# Patient Record
Sex: FEMALE | Race: WHITE | NOT HISPANIC OR LATINO | URBAN - METROPOLITAN AREA
[De-identification: names, ages, dates, MRNs, and addresses within clinical notes are randomized per-mention and may not be internally consistent; named-entity substitution may affect disease eponyms.]

---

## 2017-11-07 ENCOUNTER — APPOINTMENT (OUTPATIENT)
Dept: PHYSICAL THERAPY | Facility: CLINIC | Age: 52
End: 2017-11-07
Payer: COMMERCIAL

## 2017-11-07 PROCEDURE — 97161 PT EVAL LOW COMPLEX 20 MIN: CPT

## 2017-11-07 PROCEDURE — 97014 ELECTRIC STIMULATION THERAPY: CPT

## 2017-11-07 PROCEDURE — G8979 MOBILITY GOAL STATUS: HCPCS

## 2017-11-07 PROCEDURE — G0283 ELEC STIM OTHER THAN WOUND: HCPCS

## 2017-11-07 PROCEDURE — 97140 MANUAL THERAPY 1/> REGIONS: CPT

## 2017-11-07 PROCEDURE — G8978 MOBILITY CURRENT STATUS: HCPCS

## 2017-11-09 ENCOUNTER — APPOINTMENT (OUTPATIENT)
Dept: PHYSICAL THERAPY | Facility: CLINIC | Age: 52
End: 2017-11-09
Payer: COMMERCIAL

## 2017-11-09 PROCEDURE — 97014 ELECTRIC STIMULATION THERAPY: CPT

## 2017-11-09 PROCEDURE — 97140 MANUAL THERAPY 1/> REGIONS: CPT

## 2017-11-09 PROCEDURE — G0283 ELEC STIM OTHER THAN WOUND: HCPCS

## 2017-11-09 PROCEDURE — 97110 THERAPEUTIC EXERCISES: CPT

## 2017-11-14 ENCOUNTER — APPOINTMENT (OUTPATIENT)
Dept: PHYSICAL THERAPY | Facility: CLINIC | Age: 52
End: 2017-11-14
Payer: COMMERCIAL

## 2017-11-14 PROCEDURE — 97140 MANUAL THERAPY 1/> REGIONS: CPT

## 2017-11-14 PROCEDURE — G0283 ELEC STIM OTHER THAN WOUND: HCPCS

## 2017-11-14 PROCEDURE — 97014 ELECTRIC STIMULATION THERAPY: CPT

## 2017-11-14 PROCEDURE — 97110 THERAPEUTIC EXERCISES: CPT

## 2017-11-15 ENCOUNTER — APPOINTMENT (OUTPATIENT)
Dept: PHYSICAL THERAPY | Facility: CLINIC | Age: 52
End: 2017-11-15
Payer: COMMERCIAL

## 2017-11-21 ENCOUNTER — APPOINTMENT (OUTPATIENT)
Dept: PHYSICAL THERAPY | Facility: CLINIC | Age: 52
End: 2017-11-21
Payer: COMMERCIAL

## 2017-11-21 PROCEDURE — 97014 ELECTRIC STIMULATION THERAPY: CPT

## 2017-11-21 PROCEDURE — G0283 ELEC STIM OTHER THAN WOUND: HCPCS

## 2017-11-21 PROCEDURE — 97110 THERAPEUTIC EXERCISES: CPT

## 2017-11-21 PROCEDURE — 97140 MANUAL THERAPY 1/> REGIONS: CPT

## 2017-11-30 ENCOUNTER — APPOINTMENT (OUTPATIENT)
Dept: PHYSICAL THERAPY | Facility: CLINIC | Age: 52
End: 2017-11-30
Payer: COMMERCIAL

## 2017-12-05 ENCOUNTER — APPOINTMENT (OUTPATIENT)
Dept: PHYSICAL THERAPY | Facility: CLINIC | Age: 52
End: 2017-12-05
Payer: COMMERCIAL

## 2017-12-05 PROCEDURE — 97014 ELECTRIC STIMULATION THERAPY: CPT

## 2017-12-05 PROCEDURE — 97110 THERAPEUTIC EXERCISES: CPT

## 2017-12-05 PROCEDURE — 97140 MANUAL THERAPY 1/> REGIONS: CPT

## 2017-12-05 PROCEDURE — G0283 ELEC STIM OTHER THAN WOUND: HCPCS

## 2017-12-07 ENCOUNTER — APPOINTMENT (OUTPATIENT)
Dept: PHYSICAL THERAPY | Facility: CLINIC | Age: 52
End: 2017-12-07
Payer: COMMERCIAL

## 2017-12-07 PROCEDURE — 97140 MANUAL THERAPY 1/> REGIONS: CPT

## 2017-12-07 PROCEDURE — 97110 THERAPEUTIC EXERCISES: CPT

## 2017-12-07 PROCEDURE — G0283 ELEC STIM OTHER THAN WOUND: HCPCS

## 2017-12-07 PROCEDURE — 97014 ELECTRIC STIMULATION THERAPY: CPT

## 2017-12-12 ENCOUNTER — APPOINTMENT (OUTPATIENT)
Dept: PHYSICAL THERAPY | Facility: CLINIC | Age: 52
End: 2017-12-12
Payer: COMMERCIAL

## 2017-12-12 PROCEDURE — 97140 MANUAL THERAPY 1/> REGIONS: CPT

## 2017-12-12 PROCEDURE — 97110 THERAPEUTIC EXERCISES: CPT

## 2017-12-19 ENCOUNTER — APPOINTMENT (OUTPATIENT)
Dept: PHYSICAL THERAPY | Facility: CLINIC | Age: 52
End: 2017-12-19
Payer: COMMERCIAL

## 2017-12-19 PROCEDURE — 97110 THERAPEUTIC EXERCISES: CPT

## 2017-12-19 PROCEDURE — G8979 MOBILITY GOAL STATUS: HCPCS

## 2017-12-19 PROCEDURE — 97530 THERAPEUTIC ACTIVITIES: CPT

## 2017-12-19 PROCEDURE — 97014 ELECTRIC STIMULATION THERAPY: CPT

## 2017-12-19 PROCEDURE — G8978 MOBILITY CURRENT STATUS: HCPCS

## 2017-12-19 PROCEDURE — 97140 MANUAL THERAPY 1/> REGIONS: CPT

## 2017-12-19 PROCEDURE — G0283 ELEC STIM OTHER THAN WOUND: HCPCS

## 2017-12-21 ENCOUNTER — APPOINTMENT (OUTPATIENT)
Dept: PHYSICAL THERAPY | Facility: CLINIC | Age: 52
End: 2017-12-21
Payer: COMMERCIAL

## 2017-12-21 PROCEDURE — 97112 NEUROMUSCULAR REEDUCATION: CPT

## 2017-12-21 PROCEDURE — 97110 THERAPEUTIC EXERCISES: CPT

## 2017-12-21 PROCEDURE — 97140 MANUAL THERAPY 1/> REGIONS: CPT

## 2017-12-27 ENCOUNTER — APPOINTMENT (OUTPATIENT)
Dept: PHYSICAL THERAPY | Facility: CLINIC | Age: 52
End: 2017-12-27
Payer: COMMERCIAL

## 2017-12-27 PROCEDURE — 97110 THERAPEUTIC EXERCISES: CPT

## 2017-12-27 PROCEDURE — 97140 MANUAL THERAPY 1/> REGIONS: CPT

## 2017-12-29 ENCOUNTER — APPOINTMENT (OUTPATIENT)
Dept: PHYSICAL THERAPY | Facility: CLINIC | Age: 52
End: 2017-12-29
Payer: COMMERCIAL

## 2018-01-03 ENCOUNTER — APPOINTMENT (OUTPATIENT)
Dept: PHYSICAL THERAPY | Facility: CLINIC | Age: 53
End: 2018-01-03
Payer: COMMERCIAL

## 2018-01-03 PROCEDURE — 97140 MANUAL THERAPY 1/> REGIONS: CPT

## 2018-01-03 PROCEDURE — 97110 THERAPEUTIC EXERCISES: CPT

## 2018-01-05 ENCOUNTER — APPOINTMENT (OUTPATIENT)
Dept: PHYSICAL THERAPY | Facility: CLINIC | Age: 53
End: 2018-01-05
Payer: COMMERCIAL

## 2018-01-05 PROCEDURE — 97140 MANUAL THERAPY 1/> REGIONS: CPT

## 2018-01-05 PROCEDURE — 97110 THERAPEUTIC EXERCISES: CPT

## 2018-01-10 ENCOUNTER — APPOINTMENT (OUTPATIENT)
Dept: PHYSICAL THERAPY | Facility: CLINIC | Age: 53
End: 2018-01-10
Payer: COMMERCIAL

## 2018-01-10 PROCEDURE — 97140 MANUAL THERAPY 1/> REGIONS: CPT

## 2018-01-10 PROCEDURE — 97110 THERAPEUTIC EXERCISES: CPT

## 2018-01-15 ENCOUNTER — APPOINTMENT (OUTPATIENT)
Dept: PHYSICAL THERAPY | Facility: CLINIC | Age: 53
End: 2018-01-15
Payer: COMMERCIAL

## 2018-01-15 PROCEDURE — 97140 MANUAL THERAPY 1/> REGIONS: CPT

## 2018-01-15 PROCEDURE — 97110 THERAPEUTIC EXERCISES: CPT

## 2018-01-15 PROCEDURE — 97530 THERAPEUTIC ACTIVITIES: CPT

## 2018-01-19 ENCOUNTER — APPOINTMENT (OUTPATIENT)
Dept: PHYSICAL THERAPY | Facility: CLINIC | Age: 53
End: 2018-01-19
Payer: COMMERCIAL

## 2018-01-19 PROCEDURE — 97110 THERAPEUTIC EXERCISES: CPT

## 2018-01-19 PROCEDURE — 97140 MANUAL THERAPY 1/> REGIONS: CPT

## 2018-01-19 PROCEDURE — L3010 FOOT LONGITUDINAL ARCH SUPPO: HCPCS

## 2018-01-24 ENCOUNTER — OFFICE VISIT (OUTPATIENT)
Dept: PHYSICAL THERAPY | Facility: CLINIC | Age: 53
End: 2018-01-24
Payer: COMMERCIAL

## 2018-01-24 DIAGNOSIS — M79.671 RIGHT FOOT PAIN: ICD-10-CM

## 2018-01-24 DIAGNOSIS — M72.2 PLANTAR FASCIAL FIBROMATOSIS: Primary | ICD-10-CM

## 2018-01-24 NOTE — PROGRESS NOTES
Daily Note     Today's date: 2018  Patient name: Dominique Camarillo  : 1965  MRN: [id number]  Referring provider: Chato Moreno  Dx: No diagnosis found  Subjective:Pt reports pain level R post tib area is 4/10  Objective: See treatment diary below  Precautions: Spinal stenosis    Daily Treatment Diary     Manual              Laser 5'            GT x            K-tape x                                          Exercise Diary              Gastroc stretch 30" x 3            Soleus stretch 30" x 3            SLS - blue foam 30" x 3            Bike 6'            BAPS board M/L x 30            HR B/L with tennis ball 2 x 10                                                                                                                                                                                                      Modalities              CP/TENS 10'                                              Assessment: Tolerated treatment well  Patient could benefit from continued PT      Plan: Continue per plan of care

## 2018-01-26 ENCOUNTER — APPOINTMENT (OUTPATIENT)
Dept: PHYSICAL THERAPY | Facility: CLINIC | Age: 53
End: 2018-01-26
Payer: COMMERCIAL

## 2018-01-29 ENCOUNTER — EVALUATION (OUTPATIENT)
Dept: PHYSICAL THERAPY | Facility: CLINIC | Age: 53
End: 2018-01-29
Payer: COMMERCIAL

## 2018-01-29 ENCOUNTER — APPOINTMENT (OUTPATIENT)
Dept: PHYSICAL THERAPY | Facility: CLINIC | Age: 53
End: 2018-01-29
Payer: COMMERCIAL

## 2018-01-29 DIAGNOSIS — M72.2 PLANTAR FASCIAL FIBROMATOSIS: Primary | ICD-10-CM

## 2018-01-29 PROCEDURE — 97110 THERAPEUTIC EXERCISES: CPT

## 2018-01-29 PROCEDURE — 97140 MANUAL THERAPY 1/> REGIONS: CPT

## 2018-01-29 NOTE — PROGRESS NOTES
Daily Note     Today's date: 2018  Patient name: Yann Lloyd  : 1965  MRN: [id number]  Referring provider: Dafne Peoples  Dx:   Encounter Diagnosis   Name Primary?  Plantar fascial fibromatosis Yes                  Subjective: Notes sharp pain in heel over the weekend that was worse than usual       Objective: See treatment diary below    Precautions: Spinal stenosis    Daily Treatment Diary     Manual             Laser 5' x           GT x            K-tape x            Low dye taping  x                            Exercise Diary              Gastroc stretch 30" x 3 3x30"           Soleus stretch 30" x 3 3x30"           SLS - blue foam 30" x 3 3x30"           Bike 6' 6'           BAPS board M/L x 30 M/L x30           HR B/L with tennis ball 2 x 10 2x10                                                                                                                                                                                                     Modalities              CP/TENS 10'                                              Assessment: Tolerated treatment well  Patient could benefit from continued PT  May also potentially benefit from application of iontophoresis with dexamethasone  Plan: Continue per plan of care

## 2018-02-01 ENCOUNTER — APPOINTMENT (OUTPATIENT)
Dept: PHYSICAL THERAPY | Facility: CLINIC | Age: 53
End: 2018-02-01
Payer: COMMERCIAL

## 2018-02-08 ENCOUNTER — EVALUATION (OUTPATIENT)
Dept: PHYSICAL THERAPY | Facility: CLINIC | Age: 53
End: 2018-02-08
Payer: COMMERCIAL

## 2018-02-08 DIAGNOSIS — M72.2 PLANTAR FASCIAL FIBROMATOSIS: Primary | ICD-10-CM

## 2018-02-08 PROCEDURE — 97110 THERAPEUTIC EXERCISES: CPT

## 2018-02-08 PROCEDURE — 97140 MANUAL THERAPY 1/> REGIONS: CPT

## 2018-02-08 NOTE — PROGRESS NOTES
PT Re-Evaluation     Today's date: 2018  Patient name: Hakan Coello  : 1965  MRN: [id number]  Referring provider: Yulia Casey  Dx:   Encounter Diagnosis   Name Primary?  Plantar fascial fibromatosis Yes                  Assessment  Impairments: abnormal gait, activity intolerance and pain with function    Assessment details: Patient has been making steady progress since the start of care, however has continued functional limitations  Pt will benefit from continued PT at this time in order to progress patient toward established goals  Please consider use of iontophoresis with dexamethasone for localized inflammation and continued pain  Pt was dispensed custom fitted orthotics at today's visit; will assess response to this over the next few weeks as pt gradually increases wear time  Thank you for this referral   Understanding of Dx/Px/POC: good   Prognosis: good    Goals  ST  Pt will improve soft tissue restrictions in plantar fascia within 3 weeks  2  Pt will decrease pain at worst to <3/10 within 3 weeks  LT  Pt will be independent with HEP by discharge  2  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  Plan  Patient would benefit from: skilled PT  Planned modality interventions: TENS, cryotherapy and iontophoresis  Planned therapy interventions: neuromuscular re-education, orthotic management and training, strengthening, stretching, manual therapy, therapeutic exercise, therapeutic activities, graded exercise and home exercise program  Other planned therapy interventions: luis fernando  Frequency: 2x week  Duration in weeks: 4  Treatment plan discussed with: patient        Subjective Evaluation    History of Present Illness  Mechanism of injury: Pt notes her pain has gotten better overall  Reports reduced pain after 2 week flare up  Continues to have pain at medial heel with prolonged standing/walking    Pain  Current pain ratin  At best pain ratin  At worst pain ratin  Location: R medial heel  Quality: sharp and dull ache  Relieving factors: medications  Aggravating factors: standing and walking  Symptom course: Initially was improving, however with recent flare up pt feels it got worse in the past two weeks  Social Support    Employment status: working (professor)    Diagnostic Tests  No diagnostic tests performed        Objective     Observations     Additional Observation Details  Decreased arch height    Tenderness     Right Ankle/Foot   Tenderness in the medial calcaneus and plantar fascia       Active Range of Motion     Right Ankle/Foot   Normal active range of motion    Strength/Myotome Testing     Right Ankle/Foot   Normal strength      Precautions: Spinal stenosis     Daily Treatment Diary    "x" indicates performed  Manual                   Laser 5' x  x                 GT x    x                 K-tape x                     Low dye taping   x                                                 Exercise Diary                        Gastroc stretch 30" x 3 3x30"  x                 Soleus stretch 30" x 3 3x30"  x                 SLS - blue foam 30" x 3 3x30"  x                 Bike 6' 6'  x                 BAPS board M/L x 30 M/L x30  x                 HR B/L with tennis ball 2 x 10 2x10  x                                                                                                                                                                                                                                                                                                                                                                       Modalities                        CP/TENS 10'    x

## 2018-02-08 NOTE — LETTER
2018    Jorden Brody at [facility]    Patient: Merrill Rojas   YOB: 1965   Date of Visit: 2018       Dear Dr Tre Trimble:    Please review the attached Plan of Care from [facility] recent visit  Please verify that you agree therapy should continue by signing the attached document and sending it back to our office  If you have any questions or concerns, please don't hesitate to call  Sincerely,    Arlen Brooks, PT      Referring Provider:      I certify that I have read the below Plan of Care and certify the need for these services furnished under this plan of treatment while under my care  Jorden Brody at [address] [facility] [place]          PT Re-Evaluation     Today's date: 2018  Patient name: Merrill Rojas  : 1965  MRN: [id number]  Referring provider: Denia Walker  Dx:   Encounter Diagnosis   Name Primary?  Plantar fascial fibromatosis Yes                  Assessment  Impairments: abnormal gait, activity intolerance and pain with function    Assessment details: Patient has been making steady progress since the start of care, however has continued functional limitations  Pt will benefit from continued PT at this time in order to progress patient toward established goals  Please consider use of iontophoresis with dexamethasone for localized inflammation and continued pain  Pt was dispensed custom fitted orthotics at today's visit; will assess response to this over the next few weeks as pt gradually increases wear time  Thank you for this referral   Understanding of Dx/Px/POC: good   Prognosis: good    Goals  ST  Pt will improve soft tissue restrictions in plantar fascia within 3 weeks  2  Pt will decrease pain at worst to <3/10 within 3 weeks  LT  Pt will be independent with HEP by discharge    2  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  Plan  Patient would benefit from: skilled PT  Planned modality interventions: TENS, cryotherapy and iontophoresis  Planned therapy interventions: neuromuscular re-education, orthotic management and training, strengthening, stretching, manual therapy, therapeutic exercise, therapeutic activities, graded exercise and home exercise program  Other planned therapy interventions: graston  Frequency: 2x week  Duration in weeks: 4  Treatment plan discussed with: patient        Subjective Evaluation    History of Present Illness  Mechanism of injury: Pt notes her pain has gotten better overall  Reports reduced pain after 2 week flare up  Continues to have pain at medial heel with prolonged standing/walking  Pain  Current pain ratin  At best pain ratin  At worst pain ratin  Location: R medial heel  Quality: sharp and dull ache  Relieving factors: medications  Aggravating factors: standing and walking  Symptom course: Initially was improving, however with recent flare up pt feels it got worse in the past two weeks  Social Support    Employment status: working (professor)    Diagnostic Tests  No diagnostic tests performed        Objective     Observations     Additional Observation Details  Decreased arch height    Tenderness     Right Ankle/Foot   Tenderness in the medial calcaneus and plantar fascia       Active Range of Motion     Right Ankle/Foot   Normal active range of motion    Strength/Myotome Testing     Right Ankle/Foot   Normal strength      Precautions: Spinal stenosis     Daily Treatment Diary    "x" indicates performed  Manual                   Laser 5' x  x                 GT x    x                 K-tape x                     Low dye taping   x                                                 Exercise Diary                        Gastroc stretch 30" x 3 3x30"  x                 Soleus stretch 30" x 3 3x30"  x                 SLS - blue foam 30" x 3 3x30"  x               Bike 6' 6'  x                 BAPS board M/L x 30 M/L x30  x                 HR B/L with tennis ball 2 x 10 2x10  x                                                                                                                                                                                                                                                                                                                                                                       Modalities                        CP/TENS 10'    x

## 2018-02-14 ENCOUNTER — APPOINTMENT (OUTPATIENT)
Dept: PHYSICAL THERAPY | Facility: CLINIC | Age: 53
End: 2018-02-14
Payer: COMMERCIAL

## 2018-02-16 ENCOUNTER — OFFICE VISIT (OUTPATIENT)
Dept: PHYSICAL THERAPY | Facility: CLINIC | Age: 53
End: 2018-02-16
Payer: COMMERCIAL

## 2018-02-16 DIAGNOSIS — M72.2 PLANTAR FASCIAL FIBROMATOSIS: Primary | ICD-10-CM

## 2018-02-16 PROCEDURE — 97140 MANUAL THERAPY 1/> REGIONS: CPT

## 2018-02-16 NOTE — PROGRESS NOTES
PT Re-Evaluation     Today's date: 2018  Patient name: Lina Moulton  : 1965  MRN: [id number]  Referring provider: Gideon Beyer  Dx:   Encounter Diagnosis   Name Primary?  Plantar fascial fibromatosis Yes                  Assessment  Impairments: abnormal gait, activity intolerance and pain with function    Assessment details: Patient has been making steady progress since the start of care, however has continued functional limitations  Pt will benefit from continued PT at this time in order to progress patient toward established goals  Please consider use of iontophoresis with dexamethasone for localized inflammation and continued pain  Pt was dispensed custom fitted orthotics at today's visit; will assess response to this over the next few weeks as pt gradually increases wear time  Thank you for this referral   Understanding of Dx/Px/POC: good   Prognosis: good    Goals  ST  Pt will improve soft tissue restrictions in plantar fascia within 3 weeks  2  Pt will decrease pain at worst to <3/10 within 3 weeks  LT  Pt will be independent with HEP by discharge  2  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  Plan  Patient would benefit from: skilled PT  Planned modality interventions: TENS, cryotherapy and iontophoresis  Planned therapy interventions: neuromuscular re-education, orthotic management and training, strengthening, stretching, manual therapy, therapeutic exercise, therapeutic activities, graded exercise and home exercise program  Other planned therapy interventions: luis fernando  Frequency: 2x week  Duration in weeks: 4  Treatment plan discussed with: patient        Subjective Evaluation    History of Present Illness  Mechanism of injury: Pt notes her pain has gotten better overall  Reports reduced pain after 2 week flare up  Continues to have pain at medial heel with prolonged standing/walking    Pain  Current pain ratin  At best pain ratin  At worst pain ratin  Location: R medial heel  Quality: sharp and dull ache  Relieving factors: medications  Aggravating factors: standing and walking  Symptom course: Initially was improving, however with recent flare up pt feels it got worse in the past two weeks  Social Support    Employment status: working (professor)    Diagnostic Tests  No diagnostic tests performed        Objective     Observations     Additional Observation Details  Decreased arch height    Tenderness     Right Ankle/Foot   Tenderness in the medial calcaneus and plantar fascia       Active Range of Motion     Right Ankle/Foot   Normal active range of motion    Strength/Myotome Testing     Right Ankle/Foot   Normal strength      Precautions: Spinal stenosis     Daily Treatment Diary    "x" indicates performed  Manual                   Laser 5' x  x                 GT x    x                 K-tape x                     Low dye taping   x                                                 Exercise Diary                        Gastroc stretch 30" x 3 3x30"  x                 Soleus stretch 30" x 3 3x30"  x                 SLS - blue foam 30" x 3 3x30"  x                 Bike 6' 6'  x                 BAPS board M/L x 30 M/L x30  x                 HR B/L with tennis ball 2 x 10 2x10  x                                                                                                                                                                                                                                                                                                                                                                       Modalities                        CP/TENS 10'    x

## 2018-02-16 NOTE — PROGRESS NOTES
Daily Note     Today's date: 2018  Patient name: Khushbu Rodrigues  : 1965  MRN: [id number]  Referring provider: Coral Koch  Dx:   Encounter Diagnosis   Name Primary?  Plantar fascial fibromatosis Yes                  Subjective: Pt reports she had her foot x-rayed yesterday  Her Dr  is sending her for a consult with [facility] podiatrist, which is scheduled for 18  Objective: See treatment diary below    Daily Treatment Diary    X = performed  Manual                   Laser 5' x  x                 GT x    x                 K-tape x                     Low dye taping   x                                                 Exercise Diary                        Gastroc stretch 30" x 3 3x30"  x                 Soleus stretch 30" x 3 3x30"  x                 SLS - blue foam 30" x 3 3x30"  x                 Bike 6' 6'  x                 BAPS board M/L x 30 M/L x30  x                 HR B/L with tennis ball 2 x 10 2x10  x                                                                                                                                                                                                                                                                                                                                                                       Modalities                        CP/TENS 10'    10'                                                                         Assessment: Restrictions noted R heel during Graston  Demonstrates good knowledge of ex program      Plan: Continue per plan of care

## 2018-02-19 ENCOUNTER — APPOINTMENT (OUTPATIENT)
Dept: PHYSICAL THERAPY | Facility: CLINIC | Age: 53
End: 2018-02-19
Payer: COMMERCIAL

## 2018-02-22 ENCOUNTER — APPOINTMENT (OUTPATIENT)
Dept: PHYSICAL THERAPY | Facility: CLINIC | Age: 53
End: 2018-02-22
Payer: COMMERCIAL

## 2018-03-01 ENCOUNTER — OFFICE VISIT (OUTPATIENT)
Dept: PHYSICAL THERAPY | Facility: CLINIC | Age: 53
End: 2018-03-01
Payer: COMMERCIAL

## 2018-03-01 DIAGNOSIS — M72.2 PLANTAR FASCIAL FIBROMATOSIS: Primary | ICD-10-CM

## 2018-03-01 PROCEDURE — 97140 MANUAL THERAPY 1/> REGIONS: CPT

## 2018-03-01 PROCEDURE — 97110 THERAPEUTIC EXERCISES: CPT

## 2018-03-01 NOTE — PROGRESS NOTES
Daily Note     Today's date: 3/1/2018  Patient name: Marzena Mcdaniels  : 1965  MRN: [id number]  Referring provider: Rona Rodriguez  Dx:   Encounter Diagnosis   Name Primary?  Plantar fascial fibromatosis Yes                  Subjective: Pt reports she had her consult with Dr Luciano Payan, given prescription for iontophoresis  Pt was told she has OA in her R 1st MT head and a spur in her R heel, but feels her pain is from plantar fasciitis  Notes pain level in her R heel is 4-5/10 today  Objective: See treatment diary below    Daily Treatment Diary    X = performed  Manual  1/24 1/29  2/16  3/1               Laser 5' x  x  x               GT x    x  x               K-tape x                     Low dye taping   x                                                 Exercise Diary       2/16  3/1               Gastroc stretch 30" x 3 3x30"  x  x               Soleus stretch 30" x 3 3x30"  x  x               SLS - blue foam 30" x 3 3x30"  x  x               Bike 6' 6'  x  x               BAPS board M/L x 30 M/L x30  x  x               HR B/L with tennis ball 2 x 10 2x10  x  x                                                                                                                                                                                                                                                                                                                                                                     Modalities       2/16  3/1               CP/TENS 10'    10'  x                                                                       Assessment: Restrictions noted R heel during Graston  Demonstrates good knowledge of ex program      Plan: Continue per plan of care  Add iontophoresis Nv, when pt will have her dexamethasone

## 2018-03-09 ENCOUNTER — APPOINTMENT (OUTPATIENT)
Dept: PHYSICAL THERAPY | Facility: CLINIC | Age: 53
End: 2018-03-09
Payer: COMMERCIAL

## 2018-03-14 ENCOUNTER — OFFICE VISIT (OUTPATIENT)
Dept: PHYSICAL THERAPY | Facility: CLINIC | Age: 53
End: 2018-03-14
Payer: COMMERCIAL

## 2018-03-14 DIAGNOSIS — M72.2 PLANTAR FASCIAL FIBROMATOSIS: Primary | ICD-10-CM

## 2018-03-14 PROCEDURE — 97140 MANUAL THERAPY 1/> REGIONS: CPT

## 2018-03-14 PROCEDURE — 97014 ELECTRIC STIMULATION THERAPY: CPT

## 2018-03-14 PROCEDURE — G0283 ELEC STIM OTHER THAN WOUND: HCPCS

## 2018-03-14 PROCEDURE — 97110 THERAPEUTIC EXERCISES: CPT

## 2018-03-14 NOTE — PROGRESS NOTES
Daily Note     Today's date: 3/14/2018  Patient name: Ashely Waters  : 1965  MRN: [id number]  Referring provider: Raheel Hoyt  Dx:   Encounter Diagnosis   Name Primary?  Plantar fascial fibromatosis Yes                  Subjective: Pt returns from being out of town for a week  Pt reports her prescription for dexamethasone was not at the pharmacy when she went to pick it up  She will call Dr Jorge A Ruiz office regarding same  Notes pain level in her R heel, med border is 4-5/10  Today  Pt to clinic wearing a R hand/wrist/forearm brace  Pt is being seen by Dr Aburto for DeQuervain's,       Objective: See treatment diary below    Daily Treatment Diary    X = performed  Manual  1/24 1/29  2/16  3/1 3/14             Laser 5' x  x  x  x             GT x    x  x  x             K-tape x                     Low dye taping   x                                                 Exercise Diary       2/16  3/1  3/14             Gastroc stretch 30" x 3 3x30"  x  x  x             Soleus stretch 30" x 3 3x30"  x  x  x             SLS - blue foam 30" x 3 3x30"  x  x  x             Bike 6' 6'  x  x  x             BAPS board M/L x 30 M/L x30  x  x  x             HR B/L with tennis ball 2 x 10 2x10  x  x  x                                                                                                                                                                                                                                                                                                                                                                   Modalities       2/16  3/1  3/14             CP/TENS 10'    10'  x  x                                                                     Assessment: Restrictions noted R heel during Graston  Demonstrates good knowledge of ex program      Plan: Continue per plan of care  Add iontophoresis, when pt has dexamethasone

## 2018-03-19 ENCOUNTER — APPOINTMENT (OUTPATIENT)
Dept: PHYSICAL THERAPY | Facility: CLINIC | Age: 53
End: 2018-03-19
Payer: COMMERCIAL

## 2018-03-22 ENCOUNTER — EVALUATION (OUTPATIENT)
Dept: PHYSICAL THERAPY | Facility: CLINIC | Age: 53
End: 2018-03-22
Payer: COMMERCIAL

## 2018-03-22 DIAGNOSIS — M72.2 PLANTAR FASCIAL FIBROMATOSIS: Primary | ICD-10-CM

## 2018-03-22 PROCEDURE — 97110 THERAPEUTIC EXERCISES: CPT

## 2018-03-22 PROCEDURE — 97140 MANUAL THERAPY 1/> REGIONS: CPT

## 2018-03-22 NOTE — PROGRESS NOTES
Daily Note     Today's date: 3/22/2018  Patient name: Lena Iyer  : 1965  MRN: [id number]  Referring provider: Lian De León  Dx:   Encounter Diagnosis   Name Primary?  Plantar fascial fibromatosis Yes                  Subjective: Pt reports she is having more pain in her R heel, especially when walking  Objective: See treatment diary below    Daily Treatment Diary    X = performed  Manual  1/24 1/29  2/16  3/1 3/14  3/22           Laser 5' x  x  x  x  x           GT x    x  x  x  x           K-tape x                     Low dye taping   x                                                 Exercise Diary       2/16  3/1  3/14  3/22           Gastroc stretch 30" x 3 3x30"  x  x  x  x           Soleus stretch 30" x 3 3x30"  x  x  x  x           SLS - blue foam 30" x 3 3x30"  x  x  x  x           Bike 6' 6'  x  x  x  x           BAPS board M/L x 30 M/L x30  x  x  x  x           HR B/L with tennis ball 2 x 10 2x10  x  x  x  x                                                                                                                                                                                                                                                                                                                                                                 Modalities       2/16  3/1  3/14  3/22           CP/TENS 10'    10'  x  x              Iontophoresis            8'                                           Assessment: Performed ex program w/o increasing symptoms  Restrictions noted R heel during Graston  Added iontophoresis to R heel, given verbal instructions for same  Plan: Continue per plan of care  Add iontophoresis, when pt has dexamethasone

## 2018-03-22 NOTE — LETTER
2018    SELWYN Leon [address] [facility]    Patient: Alba Deleon   YOB: 1965   Date of Visit: 3/22/2018     Encounter Diagnosis     ICD-10-CM    1  Plantar fascial fibromatosis M72 2        Dear Dr Law Valiente:    Please review the attached Plan of Care from [facility] recent visit  Please verify that you agree therapy should continue by signing the attached document and sending it back to our office  If you have any questions or concerns, please don't hesitate to call  Sincerely,    Mary Chung PT      Referring Provider:      I certify that I have read the below Plan of Care and certify the need for these services furnished under this plan of treatment while under my care  SELWYN Leon [address]: [phone]          Daily Note     Today's date: 3/22/2018  Patient name: Alba Deleon  : 1965  MRN: [id number]  Referring provider: Fernand Hashimoto  Dx:   Encounter Diagnosis   Name Primary?  Plantar fascial fibromatosis Yes                  Subjective: Pt reports she is having more pain in her R heel, especially when walking    Objective: See treatment diary below    Daily Treatment Diary    X = performed  Manual  1/24 1/29  2/16  3/1 3/14  3/22           Laser 5' x  x  x  x  x           GT x    x  x  x  x           K-tape x                     Low dye taping   x                                                 Exercise Diary       2/16  3/1  3/14  3/22           Gastroc stretch 30" x 3 3x30"  x  x  x  x           Soleus stretch 30" x 3 3x30"  x  x  x  x           SLS - blue foam 30" x 3 3x30"  x  x  x  x           Bike 6' 6'  x  x  x  x           BAPS board M/L x 30 M/L x30  x  x  x  x           HR B/L with tennis ball 2 x 10 2x10  x  x  x  x                                                                                                                                                                                                                                                                                                                                                                 Modalities       2/16  3/1  3/14  3/22           CP/TENS 10'    10'  x  x              Iontophoresis            8'                                           Assessment: Performed ex program w/o increasing symptoms  Restrictions noted R heel during Graston  Added iontophoresis to R heel, given verbal instructions for same  Plan: Continue per plan of care  Add iontophoresis, when pt has dexamethasone  PT Re-Evaluation     Today's date: 3/22/2018  Patient name: Cindy Kendrick  : 1965  MRN: [id number]  Referring provider: Willow Castillo  Dx:   Encounter Diagnosis     ICD-10-CM    1  Plantar fascial fibromatosis M72 2        Start Time: 6449  Stop Time:   Total time in clinic (min): 52 minutes    Assessment/Plan   Patient has been making steady progress since the start of care, however has continued functional limitations  Pt will benefit from continued PT at this time in order to progress patient toward established goals  Thank you for this referral       Subjective Pt reports her pain has been pretty constant when she is on her feet  Notes she feels better when she wears her orthotics   Pt brought dexamethasone with her today for iontophoresis    Objective    Ankle strength: 5/5 throughout    Ankle DR R: 15 deg    Static stance: B/L dropped arches    SLS: decreased arch on R with transition to SLS    Palpation: tenderness/pain to medial calcaneus

## 2018-03-22 NOTE — PROGRESS NOTES
PT Re-Evaluation     Today's date: 3/22/2018  Patient name: Yann Lloyd  : 1965  MRN: [id number]  Referring provider: Dafne Peoples  Dx:   Encounter Diagnosis     ICD-10-CM    1  Plantar fascial fibromatosis M72 2        Start Time:   Stop Time: 866  Total time in clinic (min): 52 minutes    Assessment/Plan   Patient has been making steady progress since the start of care, however has continued functional limitations  Pt will benefit from continued PT at this time in order to progress patient toward established goals  Thank you for this referral       Subjective Pt reports her pain has been pretty constant when she is on her feet  Notes she feels better when she wears her orthotics   Pt brought dexamethasone with her today for iontophoresis    Objective    Ankle strength: 5/5 throughout    Ankle DR R: 15 deg    Static stance: B/L dropped arches    SLS: decreased arch on R with transition to SLS    Palpation: tenderness/pain to medial calcaneus

## 2018-03-26 ENCOUNTER — OFFICE VISIT (OUTPATIENT)
Dept: PHYSICAL THERAPY | Facility: CLINIC | Age: 53
End: 2018-03-26
Payer: COMMERCIAL

## 2018-03-26 DIAGNOSIS — M72.2 PLANTAR FASCIAL FIBROMATOSIS: Primary | ICD-10-CM

## 2018-03-26 PROCEDURE — 97140 MANUAL THERAPY 1/> REGIONS: CPT

## 2018-03-26 PROCEDURE — 97033 APP MDLTY 1+IONTPHRSIS EA 15: CPT

## 2018-03-26 NOTE — PROGRESS NOTES
Daily Note     Today's date: 3/26/2018  Patient name: Ronny Lovell  : 1965  MRN: [id number]  Referring provider: Marko Coto  Dx:   Encounter Diagnosis   Name Primary?  Plantar fascial fibromatosis Yes                  Subjective: Pt presents to PT with an occular headache reporting vision changes  Notes she rested her foot more this weekend and she noticed a difference in her pain  Objective: See treatment diary below    Daily Treatment Diary    X = performed  Manual  1/24 1/29  2/16  3/1 3/14  3/22  3/26         Laser 5' x  x  x  x  x  x         GT x    x  x  x  x  x         K-tape x                     Low dye taping   x                                                 Exercise Diary       2/16  3/1  3/14  3/22 3/26         Gastroc stretch 30" x 3 3x30"  x  x  x  x  NP         Soleus stretch 30" x 3 3x30"  x  x  x  x  NP         SLS - blue foam 30" x 3 3x30"  x  x  x  x  NP         Bike 6' 6'  x  x  x  x  NP         BAPS board M/L x 30 M/L x30  x  x  x  x  NP         HR B/L with tennis ball 2 x 10 2x10  x  x  x  x  NP                                                                                                                                                                                                                                                                                                                                                               Modalities       2/16  3/1  3/14  3/22  3/26         CP/TENS 10'    10'  x  x    NP          Iontophoresis            8'  8'                                         Assessment: Took pt's blood pressure given subjective reports  Her blood pressure was 143/72 which pt reports is high for her  Had pt lay down for 5 min in dark corner  She notes improvement in vision following this  Held therex and only performed manuals and iontophoresis  Resume NV as able  Plan: Continue per plan of care

## 2018-03-28 ENCOUNTER — OFFICE VISIT (OUTPATIENT)
Dept: PHYSICAL THERAPY | Facility: CLINIC | Age: 53
End: 2018-03-28
Payer: COMMERCIAL

## 2018-03-28 DIAGNOSIS — M72.2 PLANTAR FASCIAL FIBROMATOSIS: Primary | ICD-10-CM

## 2018-03-28 PROCEDURE — 97110 THERAPEUTIC EXERCISES: CPT

## 2018-03-28 PROCEDURE — 97140 MANUAL THERAPY 1/> REGIONS: CPT

## 2018-03-28 PROCEDURE — 97033 APP MDLTY 1+IONTPHRSIS EA 15: CPT

## 2018-03-28 NOTE — PROGRESS NOTES
Daily Note     Today's date: 3/28/2018  Patient name: Hakan Coello  : 1965  MRN: [id number]  Referring provider: Yulia Casey  Dx:   Encounter Diagnosis   Name Primary?  Plantar fascial fibromatosis Yes                  Subjective: Pt reports she rested her foot the past few days and didn't perform her exercises except stretching  Objective: See treatment diary below    **PRIVATE PAY: BILL 1 UNIT FOR IONTOPHORESIS**    Daily Treatment Diary    X = performed  Manual  1/24 1/29  2/16  3/1 3/14  3/22  3/26  3/28       Laser 5' x  x  x  x  x  x  x       GT x    x  x  x  x  x  x       K-tape x                     Low dye taping   x                                                 Exercise Diary       2/16  3/1  3/14  3/22 3/26  3/28       Gastroc stretch 30" x 3 3x30"  x  x  x  x  NP 3x30"       Soleus stretch 30" x 3 3x30"  x  x  x  x  NP 3x30"       SLS - blue foam 30" x 3 3x30"  x  x  x  x  NP  NP       Bike 6' 6'  x  x  x  x  NP  5'       BAPS board M/L x 30 M/L x30  x  x  x  x  NP x30        HR B/L with tennis ball 2 x 10 2x10  x  x  x  x  NP  NP                                                                                                                                                                                                                                                                                                                                                             Modalities       2/16  3/1  3/14  3/22  3/26  3/28       CP/TENS 10'    10'  x  x    NP          Iontophoresis            8'  8'  8'                                       Assessment: Held therex except for the stretching  Pt tolerating laser and graston with restrictions noted up into the peroneal tendon  Applied iontophoresis and issued k-tape for peroneal tendon to be applied after iontophoresis  Plan: Continue per plan of care  Continue with iontophoresis private pay

## 2018-03-30 ENCOUNTER — OFFICE VISIT (OUTPATIENT)
Dept: PHYSICAL THERAPY | Facility: CLINIC | Age: 53
End: 2018-03-30
Payer: COMMERCIAL

## 2018-03-30 DIAGNOSIS — M72.2 PLANTAR FASCIAL FIBROMATOSIS: Primary | ICD-10-CM

## 2018-03-30 PROCEDURE — 97033 APP MDLTY 1+IONTPHRSIS EA 15: CPT

## 2018-03-30 NOTE — PROGRESS NOTES
Assessment/Plan         Subjective    Objective        Daily Note     Today's date: 3/30/2018  Patient name: Kadeem Aguirre  : 1965  MRN: [id number]  Referring provider: Radha Goodrich  Dx:   Encounter Diagnosis   Name Primary?  Plantar fascial fibromatosis Yes                  Subjective: Pt reports she rested her foot the past few days and didn't perform her exercises except stretching  Objective: See treatment diary below    **PRIVATE PAY: BILL 1 UNIT FOR IONTOPHORESIS**    Daily Treatment Diary    X = performed  Manual  1/24 1/29  2/16  3/1 3/14  3/22  3/26  3/28  3/30     Laser 5' x  x  x  x  x  x  x       GT x    x  x  x  x  x  x       K-tape x                     Low dye taping   x                                                 Exercise Diary       2/16  3/1  3/14  3/22 3/26  3/28       Gastroc stretch 30" x 3 3x30"  x  x  x  x  NP 3x30"       Soleus stretch 30" x 3 3x30"  x  x  x  x  NP 3x30"       SLS - blue foam 30" x 3 3x30"  x  x  x  x  NP  NP       Bike 6' 6'  x  x  x  x  NP  5'       BAPS board M/L x 30 M/L x30  x  x  x  x  NP x30        HR B/L with tennis ball 2 x 10 2x10  x  x  x  x  NP  NP                                                                                                                                                                                                                                                                                                                                                             Modalities       2/16  3/1  3/14  3/22  3/26  3/28       CP/TENS 10'    10'  x  x    NP          Iontophoresis            8'  8'  8'                                       Assessment: Held therex except for the stretching  Pt tolerating laser and graston with restrictions noted up into the peroneal tendon  Applied iontophoresis and issued k-tape for peroneal tendon to be applied after iontophoresis  Plan: Continue per plan of care  Continue with iontophoresis private pay

## 2018-04-03 NOTE — PROGRESS NOTES
Pt called to report her insurance denied her MRI  She is in a boot for 2 weeks, with RTD on 4-12-18  Pt to "cancel" therapy appts  until that time

## 2018-05-02 NOTE — PROGRESS NOTES
Pt has not returned to therapy for greater than 30 days  Will discharge to [facility] at this time

## 2022-11-17 RX ORDER — SODIUM CHLORIDE 9 MG/ML
125 INJECTION, SOLUTION INTRAVENOUS CONTINUOUS
Status: CANCELLED | OUTPATIENT
Start: 2022-11-17

## 2022-11-18 ENCOUNTER — ANESTHESIA EVENT (OUTPATIENT)
Dept: GASTROENTEROLOGY | Facility: HOSPITAL | Age: 57
End: 2022-11-18

## 2022-11-18 ENCOUNTER — ANESTHESIA (OUTPATIENT)
Dept: GASTROENTEROLOGY | Facility: HOSPITAL | Age: 57
End: 2022-11-18

## 2022-11-18 ENCOUNTER — HOSPITAL ENCOUNTER (OUTPATIENT)
Dept: GASTROENTEROLOGY | Facility: HOSPITAL | Age: 57
Setting detail: OUTPATIENT SURGERY
End: 2022-11-18
Attending: COLON & RECTAL SURGERY

## 2022-11-18 VITALS
DIASTOLIC BLOOD PRESSURE: 55 MMHG | SYSTOLIC BLOOD PRESSURE: 111 MMHG | WEIGHT: 169 LBS | TEMPERATURE: 96.1 F | HEIGHT: 65 IN | BODY MASS INDEX: 28.16 KG/M2 | RESPIRATION RATE: 18 BRPM | HEART RATE: 54 BPM | OXYGEN SATURATION: 99 %

## 2022-11-18 DIAGNOSIS — K62.5 HEMORRHAGE OF ANUS AND RECTUM: ICD-10-CM

## 2022-11-18 DIAGNOSIS — Z12.11 ENCOUNTER FOR SCREENING FOR MALIGNANT NEOPLASM OF COLON: ICD-10-CM

## 2022-11-18 DIAGNOSIS — R19.4 CHANGE IN BOWEL HABIT: ICD-10-CM

## 2022-11-18 PROBLEM — Z86.711 HISTORY OF PULMONARY EMBOLUS (PE): Status: ACTIVE | Noted: 2019-10-08

## 2022-11-18 LAB
EXT PREGNANCY TEST URINE: NEGATIVE
EXT. CONTROL: NORMAL

## 2022-11-18 RX ORDER — PROPOFOL 10 MG/ML
INJECTION, EMULSION INTRAVENOUS CONTINUOUS PRN
Status: DISCONTINUED | OUTPATIENT
Start: 2022-11-18 | End: 2022-11-18

## 2022-11-18 RX ORDER — SODIUM CHLORIDE 9 MG/ML
125 INJECTION, SOLUTION INTRAVENOUS CONTINUOUS
Status: DISCONTINUED | OUTPATIENT
Start: 2022-11-18 | End: 2022-11-22 | Stop reason: HOSPADM

## 2022-11-18 RX ORDER — ACETAMINOPHEN 325 MG/1
650 TABLET ORAL ONCE
Status: COMPLETED | OUTPATIENT
Start: 2022-11-18 | End: 2022-11-18

## 2022-11-18 RX ORDER — LIDOCAINE HYDROCHLORIDE 10 MG/ML
INJECTION, SOLUTION EPIDURAL; INFILTRATION; INTRACAUDAL; PERINEURAL AS NEEDED
Status: DISCONTINUED | OUTPATIENT
Start: 2022-11-18 | End: 2022-11-18

## 2022-11-18 RX ORDER — PROPOFOL 10 MG/ML
INJECTION, EMULSION INTRAVENOUS AS NEEDED
Status: DISCONTINUED | OUTPATIENT
Start: 2022-11-18 | End: 2022-11-18

## 2022-11-18 RX ADMIN — LIDOCAINE HYDROCHLORIDE 50 MG: 10 INJECTION, SOLUTION EPIDURAL; INFILTRATION; INTRACAUDAL; PERINEURAL at 10:54

## 2022-11-18 RX ADMIN — ACETAMINOPHEN 650 MG: 325 TABLET ORAL at 11:45

## 2022-11-18 RX ADMIN — SODIUM CHLORIDE 125 ML/HR: 0.9 INJECTION, SOLUTION INTRAVENOUS at 08:56

## 2022-11-18 RX ADMIN — PROPOFOL 120 MG: 10 INJECTION, EMULSION INTRAVENOUS at 10:54

## 2022-11-18 RX ADMIN — PROPOFOL 140 MCG/KG/MIN: 10 INJECTION, EMULSION INTRAVENOUS at 10:54

## 2022-11-18 NOTE — DISCHARGE INSTRUCTIONS
[facility] [address], [address] [place] [address] [address]  Phone: [phone]    DISCHARGE INSTRUCTIONS:    1   ___ Complete Exam - Normal    2   ___ Exam normal, but entire colon not seen  We will discuss this with you  3   ___ Polyp(s) removed by "burning" - NO pathology report will follow    4   __2_ Polyp(s) removed by excision  Pathology report will be available in 4-5 days   Someone from our office will call you with results  5   ___x Exam prompted biopsies  Pathology report will be available in 4-5 days   Someone from our office will call you with results  6   ___ Exam demonstrated findings that need treatment  Prescriptions will be   Given to you  Return to our office in ____ weeks  Please call for appt  7   ___ Original office visit or colonoscopy findings necessitate an office visit  Please call to set up a new appointment    8   ___ Medication  __________________________________________        [address]:    - Go straight home and rest today    - No driving or drinking alcohol for 24 hours    - Resume regular diet and medications unless otherwise instructed  Coumadin and Plavix are blood thinners  You can resume these medications on __________      IF YOU ARE HAVING ANY FEVER, BLEEDING OR PERSISTENT PAIN IN THE ABDOMEN, PLEASE CALL OUR OFFICE ANY DAY OR TIME  [phone]

## 2022-11-18 NOTE — ANESTHESIA PREPROCEDURE EVALUATION
Procedure:  COLONOSCOPY    Relevant Problems   ANESTHESIA (within normal limits)   (-) History of anesthesia complications      CARDIO   (+) PSVT (paroxysmal supraventricular tachycardia) (HCC) (stress related, no recent episodes)   (-) Chest pain   (-) JACKSON (dyspnea on exertion)      NEURO/PSYCH   (+) History of pulmonary embolus (PE)      PULMONARY   (-) Shortness of breath   (-) URI (upper respiratory infection)        Physical Exam    Airway    Mallampati score: II  TM Distance: >3 FB  Neck ROM: full     Dental       Cardiovascular      Pulmonary      Other Findings        Anesthesia Plan  ASA Score- 2     Anesthesia Type- IV sedation with anesthesia with ASA Monitors  Additional Monitors:   Airway Plan:           Plan Factors-Exercise tolerance (METS): >4 METS  Chart reviewed  Patient summary reviewed  Induction- intravenous  Postoperative Plan-     Informed Consent- Anesthetic plan and risks discussed with patient  I personally reviewed this patient with the CRNA  Discussed and agreed on the Anesthesia Plan with the CRNA  Raquel Alves

## 2022-11-18 NOTE — ANESTHESIA POSTPROCEDURE EVALUATION
Post-Op Assessment Note    CV Status:  Stable  Pain Score: 0    Pain management: adequate     Mental Status:  Alert and awake   Hydration Status:  Euvolemic   PONV Controlled:  Controlled   Airway Patency:  Patent      Post Op Vitals Reviewed: Yes      Staff: CRNA         No notable events documented      BP   102/54   Temp     Pulse 56   Resp 16   SpO2 98

## 2022-11-18 NOTE — INTERVAL H&P NOTE
H&P reviewed  After examining the patient I find no changes in the patients condition since the H&P had been written      Vitals:    11/18/22 0851   BP: 132/73   Pulse: 64   Resp: 18   Temp: (!) 97 1 °F (36 2 °C)   SpO2: 97%

## 2023-01-06 ENCOUNTER — ANESTHESIA EVENT (OUTPATIENT)
Dept: GASTROENTEROLOGY | Facility: HOSPITAL | Age: 58
End: 2023-01-06

## 2023-01-06 ENCOUNTER — HOSPITAL ENCOUNTER (OUTPATIENT)
Dept: GASTROENTEROLOGY | Facility: HOSPITAL | Age: 58
Setting detail: OUTPATIENT SURGERY
End: 2023-01-06
Attending: COLON & RECTAL SURGERY

## 2023-01-06 ENCOUNTER — ANESTHESIA (OUTPATIENT)
Dept: GASTROENTEROLOGY | Facility: HOSPITAL | Age: 58
End: 2023-01-06

## 2023-01-06 VITALS
DIASTOLIC BLOOD PRESSURE: 52 MMHG | BODY MASS INDEX: 28.16 KG/M2 | OXYGEN SATURATION: 98 % | TEMPERATURE: 98 F | HEART RATE: 53 BPM | RESPIRATION RATE: 18 BRPM | HEIGHT: 65 IN | SYSTOLIC BLOOD PRESSURE: 108 MMHG | WEIGHT: 169 LBS

## 2023-01-06 DIAGNOSIS — C20 MALIGNANT NEOPLASM OF RECTUM (HCC): ICD-10-CM

## 2023-01-06 RX ORDER — SODIUM CHLORIDE, SODIUM LACTATE, POTASSIUM CHLORIDE, CALCIUM CHLORIDE 600; 310; 30; 20 MG/100ML; MG/100ML; MG/100ML; MG/100ML
50 INJECTION, SOLUTION INTRAVENOUS CONTINUOUS
Status: CANCELLED | OUTPATIENT
Start: 2023-01-06

## 2023-01-06 RX ORDER — PROPOFOL 10 MG/ML
INJECTION, EMULSION INTRAVENOUS AS NEEDED
Status: DISCONTINUED | OUTPATIENT
Start: 2023-01-06 | End: 2023-01-06

## 2023-01-06 RX ORDER — LIDOCAINE HYDROCHLORIDE 10 MG/ML
INJECTION, SOLUTION EPIDURAL; INFILTRATION; INTRACAUDAL; PERINEURAL AS NEEDED
Status: DISCONTINUED | OUTPATIENT
Start: 2023-01-06 | End: 2023-01-06

## 2023-01-06 RX ORDER — SODIUM CHLORIDE, SODIUM LACTATE, POTASSIUM CHLORIDE, CALCIUM CHLORIDE 600; 310; 30; 20 MG/100ML; MG/100ML; MG/100ML; MG/100ML
INJECTION, SOLUTION INTRAVENOUS CONTINUOUS PRN
Status: DISCONTINUED | OUTPATIENT
Start: 2023-01-06 | End: 2023-01-06

## 2023-01-06 RX ORDER — B-COMPLEX WITH VITAMIN C
TABLET ORAL
COMMUNITY

## 2023-01-06 RX ADMIN — PROPOFOL 40 MG: 10 INJECTION, EMULSION INTRAVENOUS at 09:49

## 2023-01-06 RX ADMIN — LIDOCAINE HYDROCHLORIDE 30 MG: 10 INJECTION, SOLUTION EPIDURAL; INFILTRATION; INTRACAUDAL; PERINEURAL at 09:46

## 2023-01-06 RX ADMIN — PROPOFOL 50 MG: 10 INJECTION, EMULSION INTRAVENOUS at 09:47

## 2023-01-06 RX ADMIN — PROPOFOL 60 MG: 10 INJECTION, EMULSION INTRAVENOUS at 09:50

## 2023-01-06 RX ADMIN — SODIUM CHLORIDE, SODIUM LACTATE, POTASSIUM CHLORIDE, AND CALCIUM CHLORIDE: .6; .31; .03; .02 INJECTION, SOLUTION INTRAVENOUS at 09:00

## 2023-01-06 RX ADMIN — PROPOFOL 150 MG: 10 INJECTION, EMULSION INTRAVENOUS at 09:46

## 2023-01-06 NOTE — ANESTHESIA POSTPROCEDURE EVALUATION
Post-Op Assessment Note    CV Status:  Stable  Pain Score: 0    Pain management: adequate     Mental Status:  Alert and awake   Hydration Status:  Euvolemic   PONV Controlled:  Controlled   Airway Patency:  Patent      Post Op Vitals Reviewed: Yes      Staff: CRNA         No notable events documented      BP   109/57   Temp      Pulse 80   Resp 16   SpO2 96

## 2023-01-06 NOTE — DISCHARGE INSTRUCTIONS
[facility] [address], [address] [place] [address] [address]  Phone: [phone]    DISCHARGE INSTRUCTIONS:    1   ___ Complete Exam - Ink injected    2   ___ Exam normal, but entire colon not seen  We will discuss this with you  3   ___ Polyp(s) removed by "burning" - NO pathology report will follow    4   ___ Polyp(s) removed by excision  Pathology report will be available in 4-5 days   Someone from our office will call you with results  5   ___ Exam prompted biopsies  Pathology report will be available in 4-5 days   Someone from our office will call you with results  6   ___ Exam demonstrated findings that need treatment  Prescriptions will be   Given to you  Return to our office in ____ weeks  Please call for appt  7   ___ Original office visit or colonoscopy findings necessitate an office visit  Please call to set up a new appointment    8   ___ Medication  __________________________________________        [address]:    - Go straight home and rest today    - No driving or drinking alcohol for 24 hours    - Resume regular diet and medications unless otherwise instructed  Coumadin and Plavix are blood thinners  You can resume these medications on __________      IF YOU ARE HAVING ANY FEVER, BLEEDING OR PERSISTENT PAIN IN THE ABDOMEN, PLEASE CALL OUR OFFICE ANY DAY OR TIME  [phone]

## 2023-01-06 NOTE — ANESTHESIA PREPROCEDURE EVALUATION
Inject vitamin B12 under the skin once a day for 7 days  Then inject once a week for 8 weeks  Get CT scan  Follow up in 2 months with labs prior Procedure:  FLEXIBLE SIGMOIDOSCOPY    Relevant Problems   CARDIO   (+) PSVT (paroxysmal supraventricular tachycardia) (HCC)      NEURO/PSYCH   (+) History of pulmonary embolus (PE)        Physical Exam    Airway    Mallampati score: II  TM Distance: >3 FB  Neck ROM: full     Dental       Cardiovascular      Pulmonary      Other Findings        Anesthesia Plan  ASA Score- 3     Anesthesia Type- IV sedation with anesthesia with ASA Monitors  Additional Monitors:   Airway Plan:           Plan Factors-Exercise tolerance (METS): >4 METS  Chart reviewed  Patient summary reviewed  Patient is not a current smoker  Patient did not smoke on day of surgery  Induction- intravenous  Postoperative Plan-     Informed Consent- Anesthetic plan and risks discussed with patient  I personally reviewed this patient with the CRNA  Discussed and agreed on the Anesthesia Plan with the CRNA  Raquel Alves

## 2023-01-06 NOTE — INTERVAL H&P NOTE
H&P reviewed  After examining the patient I find no changes in the patients condition since the H&P had been written      Vitals:    01/06/23 0900   BP: 112/68   Pulse: 63   Resp: 18   Temp: 98 °F (36 7 °C)   SpO2: 98%